# Patient Record
Sex: MALE | Race: WHITE | Employment: FULL TIME | URBAN - METROPOLITAN AREA
[De-identification: names, ages, dates, MRNs, and addresses within clinical notes are randomized per-mention and may not be internally consistent; named-entity substitution may affect disease eponyms.]

---

## 2017-01-30 ENCOUNTER — TELEPHONE (OUTPATIENT)
Dept: PULMONOLOGY | Age: 32
End: 2017-01-30

## 2017-02-10 ENCOUNTER — OFFICE VISIT (OUTPATIENT)
Dept: PULMONOLOGY | Age: 32
End: 2017-02-10

## 2017-02-10 VITALS
HEIGHT: 74 IN | OXYGEN SATURATION: 98 % | HEART RATE: 74 BPM | RESPIRATION RATE: 14 BRPM | TEMPERATURE: 97.8 F | BODY MASS INDEX: 37.06 KG/M2 | SYSTOLIC BLOOD PRESSURE: 122 MMHG | WEIGHT: 288.8 LBS | DIASTOLIC BLOOD PRESSURE: 80 MMHG

## 2017-02-10 DIAGNOSIS — G47.33 OSA (OBSTRUCTIVE SLEEP APNEA): Primary | ICD-10-CM

## 2017-02-10 DIAGNOSIS — E66.9 OBESITY (BMI 30-39.9): ICD-10-CM

## 2017-02-10 PROCEDURE — 99213 OFFICE O/P EST LOW 20 MIN: CPT | Performed by: NURSE PRACTITIONER

## 2017-02-10 RX ORDER — SERTRALINE HYDROCHLORIDE 100 MG/1
100 TABLET, FILM COATED ORAL DAILY
COMMUNITY

## 2017-02-10 ASSESSMENT — SLEEP AND FATIGUE QUESTIONNAIRES
HOW LIKELY ARE YOU TO NOD OFF OR FALL ASLEEP WHILE WATCHING TV: 1
HOW LIKELY ARE YOU TO NOD OFF OR FALL ASLEEP IN A CAR, WHILE STOPPED FOR A FEW MINUTES IN TRAFFIC: 0
HOW LIKELY ARE YOU TO NOD OFF OR FALL ASLEEP WHILE SITTING QUIETLY AFTER LUNCH WITHOUT ALCOHOL: 0
HOW LIKELY ARE YOU TO NOD OFF OR FALL ASLEEP WHILE SITTING AND READING: 0
HOW LIKELY ARE YOU TO NOD OFF OR FALL ASLEEP WHILE SITTING AND TALKING TO SOMEONE: 0
NECK CIRCUMFERENCE (INCHES): 17.5
HOW LIKELY ARE YOU TO NOD OFF OR FALL ASLEEP WHEN YOU ARE A PASSENGER IN A CAR FOR AN HOUR WITHOUT A BREAK: 0
HOW LIKELY ARE YOU TO NOD OFF OR FALL ASLEEP WHILE LYING DOWN TO REST IN THE AFTERNOON WHEN CIRCUMSTANCES PERMIT: 3
ESS TOTAL SCORE: 4
HOW LIKELY ARE YOU TO NOD OFF OR FALL ASLEEP WHILE SITTING INACTIVE IN A PUBLIC PLACE: 0

## 2017-02-22 ENCOUNTER — HOSPITAL ENCOUNTER (OUTPATIENT)
Dept: SLEEP MEDICINE | Age: 32
Discharge: HOME | End: 2017-02-24
Attending: INTERNAL MEDICINE | Admitting: INTERNAL MEDICINE

## 2017-02-22 DIAGNOSIS — G47.33 OSA (OBSTRUCTIVE SLEEP APNEA): ICD-10-CM

## 2017-02-27 DIAGNOSIS — G47.33 OSA (OBSTRUCTIVE SLEEP APNEA): Primary | ICD-10-CM

## 2017-04-17 ENCOUNTER — TELEPHONE (OUTPATIENT)
Dept: PULMONOLOGY | Age: 32
End: 2017-04-17

## 2017-06-05 ENCOUNTER — TELEPHONE (OUTPATIENT)
Dept: PULMONOLOGY | Age: 32
End: 2017-06-05

## 2018-02-13 ENCOUNTER — TELEPHONE (OUTPATIENT)
Dept: ADMINISTRATIVE | Age: 33
End: 2018-02-13

## 2018-02-13 NOTE — TELEPHONE ENCOUNTER
Looks as patient has been a no show multiple times with pulmonology. Please inform patient that I am able to see him as a NP. Please give him office regulations. No controlled substances. Recommend fasting BW if possible.

## 2018-02-13 NOTE — TELEPHONE ENCOUNTER
[facility] girlfriend calling for Sam Westbrook to become a new pt . He has [facility] . No meds please call them back. Would want to est with a primary.

## 2019-03-26 ENCOUNTER — APPOINTMENT (OUTPATIENT)
Dept: GENERAL RADIOLOGY | Age: 34
End: 2019-03-26
Payer: COMMERCIAL

## 2019-03-26 ENCOUNTER — HOSPITAL ENCOUNTER (EMERGENCY)
Age: 34
Discharge: HOME | End: 2019-03-26
Attending: EMERGENCY MEDICINE
Payer: COMMERCIAL

## 2019-03-26 VITALS
HEART RATE: 115 BPM | BODY MASS INDEX: 40.43 KG/M2 | WEIGHT: 315 LBS | RESPIRATION RATE: 16 BRPM | TEMPERATURE: 99 F | HEIGHT: 74 IN | DIASTOLIC BLOOD PRESSURE: 80 MMHG | SYSTOLIC BLOOD PRESSURE: 122 MMHG

## 2019-03-26 DIAGNOSIS — J06.9 VIRAL URI WITH COUGH: Primary | ICD-10-CM

## 2019-03-26 DIAGNOSIS — J10.1 INFLUENZA A: ICD-10-CM

## 2019-03-26 LAB
RAPID INFLUENZA  B AGN: NEGATIVE
RAPID INFLUENZA A AGN: POSITIVE

## 2019-03-26 PROCEDURE — 99283 EMERGENCY DEPT VISIT LOW MDM: CPT

## 2019-03-26 PROCEDURE — 87804 INFLUENZA ASSAY W/OPTIC: CPT

## 2019-03-26 PROCEDURE — 71046 X-RAY EXAM CHEST 2 VIEWS: CPT

## 2019-03-26 RX ORDER — GUAIFENESIN/DEXTROMETHORPHAN 100-10MG/5
10 SYRUP ORAL 3 TIMES DAILY PRN
Qty: 100 ML | Refills: 0 | Status: SHIPPED | OUTPATIENT
Start: 2019-03-26 | End: 2019-03-29

## 2019-03-26 ASSESSMENT — ENCOUNTER SYMPTOMS
VOICE CHANGE: 0
WHEEZING: 0
STRIDOR: 0
SHORTNESS OF BREATH: 0
TROUBLE SWALLOWING: 0
SINUS PRESSURE: 1
FACIAL SWELLING: 0
SORE THROAT: 1
COUGH: 1
SINUS PAIN: 1
CHEST TIGHTNESS: 0
BACK PAIN: 0
ABDOMINAL PAIN: 0

## 2019-03-26 ASSESSMENT — PAIN DESCRIPTION - FREQUENCY: FREQUENCY: INTERMITTENT

## 2019-03-26 ASSESSMENT — PAIN DESCRIPTION - PAIN TYPE: TYPE: ACUTE PAIN

## 2019-03-26 ASSESSMENT — PAIN DESCRIPTION - DESCRIPTORS: DESCRIPTORS: DISCOMFORT

## 2019-03-26 ASSESSMENT — PAIN DESCRIPTION - LOCATION: LOCATION: CHEST

## 2022-02-14 ENCOUNTER — HOSPITAL ENCOUNTER (OUTPATIENT)
Age: 37
Discharge: HOME | End: 2022-02-14
Payer: COMMERCIAL

## 2022-02-14 LAB
A/G RATIO: 1.9 (ref 1.1–2.2)
ALBUMIN SERPL-MCNC: 4.8 G/DL (ref 3.4–5)
ALP BLD-CCNC: 83 U/L (ref 40–129)
ALT SERPL-CCNC: 58 U/L (ref 10–40)
ANION GAP SERPL CALCULATED.3IONS-SCNC: 12 MMOL/L (ref 3–16)
AST SERPL-CCNC: 31 U/L (ref 15–37)
BASOPHILS ABSOLUTE: 0.1 K/UL (ref 0–0.2)
BASOPHILS RELATIVE PERCENT: 0.9 %
BILIRUB SERPL-MCNC: 0.7 MG/DL (ref 0–1)
BUN BLDV-MCNC: 15 MG/DL (ref 7–20)
BURR CELLS: ABNORMAL
CALCIUM SERPL-MCNC: 9.5 MG/DL (ref 8.3–10.6)
CHLORIDE BLD-SCNC: 101 MMOL/L (ref 99–110)
CO2: 24 MMOL/L (ref 21–32)
CREAT SERPL-MCNC: 0.9 MG/DL (ref 0.9–1.3)
EOSINOPHILS ABSOLUTE: 0.2 K/UL (ref 0–0.6)
EOSINOPHILS RELATIVE PERCENT: 2.6 %
GFR AFRICAN AMERICAN: >60
GFR NON-AFRICAN AMERICAN: >60
GLUCOSE BLD-MCNC: 301 MG/DL (ref 70–99)
HCT VFR BLD CALC: 40.7 % (ref 40.5–52.5)
HEMATOLOGY PATH CONSULT: YES
HEMOGLOBIN: 13.3 G/DL (ref 13.5–17.5)
LYMPHOCYTES ABSOLUTE: 2.1 K/UL (ref 1–5.1)
LYMPHOCYTES RELATIVE PERCENT: 28.9 %
MCH RBC QN AUTO: 21.2 PG (ref 26–34)
MCHC RBC AUTO-ENTMCNC: 32.7 G/DL (ref 31–36)
MCV RBC AUTO: 65 FL (ref 80–100)
MONOCYTES ABSOLUTE: 0.6 K/UL (ref 0–1.3)
MONOCYTES RELATIVE PERCENT: 8.6 %
NEUTROPHILS ABSOLUTE: 4.4 K/UL (ref 1.7–7.7)
NEUTROPHILS RELATIVE PERCENT: 59 %
PDW BLD-RTO: 15.8 % (ref 12.4–15.4)
PLATELET # BLD: 194 K/UL (ref 135–450)
PLATELET SLIDE REVIEW: ADEQUATE
PMV BLD AUTO: 8 FL (ref 5–10.5)
POTASSIUM SERPL-SCNC: 4.2 MMOL/L (ref 3.5–5.1)
RBC # BLD: 6.25 M/UL (ref 4.2–5.9)
SLIDE REVIEW: ABNORMAL
SODIUM BLD-SCNC: 137 MMOL/L (ref 136–145)
TOTAL PROTEIN: 7.3 G/DL (ref 6.4–8.2)
WBC # BLD: 7.4 K/UL (ref 4–11)

## 2022-02-14 PROCEDURE — 83036 HEMOGLOBIN GLYCOSYLATED A1C: CPT

## 2022-02-14 PROCEDURE — 36415 COLL VENOUS BLD VENIPUNCTURE: CPT

## 2022-02-14 PROCEDURE — 80053 COMPREHEN METABOLIC PANEL: CPT

## 2022-02-14 PROCEDURE — 84439 ASSAY OF FREE THYROXINE: CPT

## 2022-02-14 PROCEDURE — 84481 FREE ASSAY (FT-3): CPT

## 2022-02-14 PROCEDURE — 82607 VITAMIN B-12: CPT

## 2022-02-14 PROCEDURE — 82306 VITAMIN D 25 HYDROXY: CPT

## 2022-02-14 PROCEDURE — 84443 ASSAY THYROID STIM HORMONE: CPT

## 2022-02-14 PROCEDURE — 82746 ASSAY OF FOLIC ACID SERUM: CPT

## 2022-02-14 PROCEDURE — 85025 COMPLETE CBC W/AUTO DIFF WBC: CPT

## 2022-02-15 LAB
ESTIMATED AVERAGE GLUCOSE: 188.6 MG/DL
FOLATE: 12.13 NG/ML (ref 4.78–24.2)
HBA1C MFR BLD: 8.2 %
HEMATOLOGY PATH CONSULT: NORMAL
T3 FREE: 4.1 PG/ML (ref 2.3–4.2)
T4 FREE: 1.4 NG/DL (ref 0.9–1.8)
TSH SERPL DL<=0.05 MIU/L-ACNC: 4.35 UIU/ML (ref 0.27–4.2)
VITAMIN B-12: 706 PG/ML (ref 211–911)
VITAMIN D 25-HYDROXY: 19.4 NG/ML

## 2023-04-25 ENCOUNTER — HOSPITAL ENCOUNTER (OUTPATIENT)
Dept: GENERAL RADIOLOGY | Age: 38
Discharge: HOME | End: 2023-04-25
Payer: COMMERCIAL

## 2023-04-25 ENCOUNTER — HOSPITAL ENCOUNTER (OUTPATIENT)
Age: 38
Discharge: HOME | End: 2023-04-25
Payer: COMMERCIAL

## 2023-04-25 DIAGNOSIS — G89.29 CHRONIC LOW BACK PAIN WITHOUT SCIATICA, UNSPECIFIED BACK PAIN LATERALITY: ICD-10-CM

## 2023-04-25 DIAGNOSIS — M54.2 NECK PAIN: ICD-10-CM

## 2023-04-25 DIAGNOSIS — M54.50 CHRONIC LOW BACK PAIN WITHOUT SCIATICA, UNSPECIFIED BACK PAIN LATERALITY: ICD-10-CM

## 2023-04-25 PROCEDURE — 72100 X-RAY EXAM L-S SPINE 2/3 VWS: CPT

## 2023-04-25 PROCEDURE — 72040 X-RAY EXAM NECK SPINE 2-3 VW: CPT

## 2023-05-03 ENCOUNTER — HOSPITAL ENCOUNTER (OUTPATIENT)
Dept: GENERAL RADIOLOGY | Age: 38
Discharge: HOME | End: 2023-05-03
Payer: COMMERCIAL

## 2023-05-03 ENCOUNTER — HOSPITAL ENCOUNTER (OUTPATIENT)
Age: 38
Discharge: HOME | End: 2023-05-03
Payer: COMMERCIAL

## 2023-05-03 DIAGNOSIS — S05.40XA RETROBULBAR FOREIGN BODY, UNSPECIFIED LATERALITY: ICD-10-CM

## 2023-05-03 PROCEDURE — 70030 X-RAY EYE FOR FOREIGN BODY: CPT

## 2023-11-01 ENCOUNTER — HOSPITAL ENCOUNTER (EMERGENCY)
Age: 38
Discharge: HOME | End: 2023-11-01
Attending: EMERGENCY MEDICINE
Payer: COMMERCIAL

## 2023-11-01 VITALS
TEMPERATURE: 97.5 F | HEART RATE: 88 BPM | OXYGEN SATURATION: 98 % | SYSTOLIC BLOOD PRESSURE: 161 MMHG | RESPIRATION RATE: 20 BRPM | DIASTOLIC BLOOD PRESSURE: 97 MMHG

## 2023-11-01 DIAGNOSIS — H16.133 FLASH BURN TO EYE, BILATERAL: Primary | ICD-10-CM

## 2023-11-01 PROCEDURE — 6370000000 HC RX 637 (ALT 250 FOR IP): Performed by: EMERGENCY MEDICINE

## 2023-11-01 PROCEDURE — 6370000000 HC RX 637 (ALT 250 FOR IP)

## 2023-11-01 PROCEDURE — 99283 EMERGENCY DEPT VISIT LOW MDM: CPT

## 2023-11-01 RX ORDER — TETRACAINE HYDROCHLORIDE 5 MG/ML
1 SOLUTION OPHTHALMIC ONCE
Status: COMPLETED | OUTPATIENT
Start: 2023-11-01 | End: 2023-11-01

## 2023-11-01 RX ORDER — ERYTHROMYCIN 5 MG/G
OINTMENT OPHTHALMIC ONCE
Status: COMPLETED | OUTPATIENT
Start: 2023-11-01 | End: 2023-11-01

## 2023-11-01 RX ORDER — ERYTHROMYCIN 5 MG/G
OINTMENT OPHTHALMIC EVERY 6 HOURS
Qty: 1 G | Refills: 0 | Status: SHIPPED | OUTPATIENT
Start: 2023-11-01 | End: 2023-11-06

## 2023-11-01 RX ORDER — TETRACAINE HYDROCHLORIDE 5 MG/ML
SOLUTION OPHTHALMIC
Status: COMPLETED
Start: 2023-11-01 | End: 2023-11-01

## 2023-11-01 RX ADMIN — TETRACAINE HYDROCHLORIDE 1 DROP: 5 SOLUTION OPHTHALMIC at 00:56

## 2023-11-01 RX ADMIN — ERYTHROMYCIN: 5 OINTMENT OPHTHALMIC at 01:40

## 2023-11-01 ASSESSMENT — VISUAL ACUITY
OS: 20/20
OU: 20/20
OD: 20/30

## 2023-11-01 ASSESSMENT — PAIN DESCRIPTION - ORIENTATION: ORIENTATION: RIGHT;LEFT

## 2023-11-01 ASSESSMENT — PAIN DESCRIPTION - DESCRIPTORS: DESCRIPTORS: BURNING

## 2023-11-01 ASSESSMENT — PAIN DESCRIPTION - LOCATION: LOCATION: EYE

## 2023-11-01 ASSESSMENT — PAIN SCALES - GENERAL: PAINLEVEL_OUTOF10: 9

## 2023-11-01 ASSESSMENT — PAIN - FUNCTIONAL ASSESSMENT: PAIN_FUNCTIONAL_ASSESSMENT: 0-10
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
## 2023-12-16 ENCOUNTER — HOSPITAL ENCOUNTER (EMERGENCY)
Age: 38
Discharge: HOME | End: 2023-12-16
Attending: EMERGENCY MEDICINE
Payer: COMMERCIAL

## 2023-12-16 ENCOUNTER — APPOINTMENT (OUTPATIENT)
Dept: GENERAL RADIOLOGY | Age: 38
End: 2023-12-16
Payer: COMMERCIAL

## 2023-12-16 VITALS
OXYGEN SATURATION: 97 % | TEMPERATURE: 97.6 F | HEART RATE: 96 BPM | RESPIRATION RATE: 18 BRPM | SYSTOLIC BLOOD PRESSURE: 156 MMHG | WEIGHT: 286 LBS | BODY MASS INDEX: 36.7 KG/M2 | HEIGHT: 74 IN | DIASTOLIC BLOOD PRESSURE: 98 MMHG

## 2023-12-16 DIAGNOSIS — M77.32 HEEL SPUR, LEFT: Primary | ICD-10-CM

## 2023-12-16 PROCEDURE — 6370000000 HC RX 637 (ALT 250 FOR IP): Performed by: EMERGENCY MEDICINE

## 2023-12-16 PROCEDURE — 73630 X-RAY EXAM OF FOOT: CPT

## 2023-12-16 RX ORDER — IBUPROFEN 600 MG/1
600 TABLET ORAL 4 TIMES DAILY PRN
Qty: 40 TABLET | Refills: 0 | Status: SHIPPED | OUTPATIENT
Start: 2023-12-16

## 2023-12-16 RX ORDER — IBUPROFEN 600 MG/1
600 TABLET ORAL ONCE
Status: COMPLETED | OUTPATIENT
Start: 2023-12-16 | End: 2023-12-16

## 2023-12-16 RX ADMIN — IBUPROFEN 600 MG: 600 TABLET, FILM COATED ORAL at 18:18

## 2023-12-16 ASSESSMENT — PAIN - FUNCTIONAL ASSESSMENT
PAIN_FUNCTIONAL_ASSESSMENT: PREVENTS OR INTERFERES SOME ACTIVE ACTIVITIES AND ADLS
PAIN_FUNCTIONAL_ASSESSMENT: 0-10

## 2023-12-16 ASSESSMENT — PAIN DESCRIPTION - LOCATION: LOCATION: FOOT

## 2023-12-16 ASSESSMENT — PAIN DESCRIPTION - DESCRIPTORS: DESCRIPTORS: SHARP

## 2023-12-16 ASSESSMENT — PAIN DESCRIPTION - FREQUENCY: FREQUENCY: CONTINUOUS

## 2023-12-16 ASSESSMENT — PAIN DESCRIPTION - ORIENTATION: ORIENTATION: LEFT

## 2023-12-16 ASSESSMENT — PAIN DESCRIPTION - ONSET: ONSET: ON-GOING

## 2023-12-16 ASSESSMENT — LIFESTYLE VARIABLES
HOW MANY STANDARD DRINKS CONTAINING ALCOHOL DO YOU HAVE ON A TYPICAL DAY: PATIENT DOES NOT DRINK
HOW OFTEN DO YOU HAVE A DRINK CONTAINING ALCOHOL: NEVER

## 2023-12-16 ASSESSMENT — PAIN DESCRIPTION - PAIN TYPE: TYPE: ACUTE PAIN

## 2023-12-16 ASSESSMENT — PAIN SCALES - GENERAL: PAINLEVEL_OUTOF10: 6

## 2023-12-16 NOTE — DISCHARGE INSTRUCTIONS
Your x-ray was that you have a heel spur. This is most likely causing a mild plantar fasciitis. Please take your medication as prescribed.   If your symptoms worsen despite treatment please come back to the ER for repeat

## 2023-12-16 NOTE — ED NOTES
AVS provided and reviewed with the patient. The patient verbalized understanding of care at home, follow up care, and emergent symptoms to return for. No questions or concerns verbalized at this time. The patient is alert, oriented, stable, and ambulatory out of the department at the time of discharge.        Nguyễn Lozano RN  12/16/23 [id number]

## 2023-12-16 NOTE — ED PROVIDER NOTES
TO:  Roger Rodriguez MD  [address] [place] [address] [address] [facility] [address]  [phone]    Call         DISCHARGE MEDICATIONS:  Discharge Medication List as of 12/16/2023  6:47 PM        START taking these medications    Details   ibuprofen (ADVIL;MOTRIN) 600 MG tablet Take 1 tablet by mouth 4 times daily as needed for Pain, Disp-40 tablet, R-0Normal           Controlled Substances Monitoring:          No data to display                (Please note that portions of this note were completed with a voice recognition program.  Efforts were made to edit the dictations but occasionally words are mis-transcribed. )    Loly Jewell MD (electronically signed)  Attending Emergency Physician            Loly Jewell MD  12/17/23 0523